# Patient Record
Sex: FEMALE | Race: OTHER | HISPANIC OR LATINO | URBAN - METROPOLITAN AREA
[De-identification: names, ages, dates, MRNs, and addresses within clinical notes are randomized per-mention and may not be internally consistent; named-entity substitution may affect disease eponyms.]

---

## 2017-04-26 ENCOUNTER — EMERGENCY (EMERGENCY)
Facility: HOSPITAL | Age: 57
LOS: 1 days | Discharge: LEFT BEFORE DISCHARGE | End: 2017-04-26
Attending: EMERGENCY MEDICINE
Payer: MEDICAID

## 2017-04-26 VITALS
HEART RATE: 77 BPM | WEIGHT: 199.96 LBS | TEMPERATURE: 98 F | HEIGHT: 62 IN | SYSTOLIC BLOOD PRESSURE: 146 MMHG | OXYGEN SATURATION: 100 % | DIASTOLIC BLOOD PRESSURE: 89 MMHG | RESPIRATION RATE: 20 BRPM
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
## 2017-04-26 DIAGNOSIS — T54.3X1A TOXIC EFFECT OF CORROSIVE ALKALIS AND ALKALI-LIKE SUBSTANCES, ACCIDENTAL (UNINTENTIONAL), INITIAL ENCOUNTER: ICD-10-CM
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
## 2017-04-26 PROCEDURE — 99285 EMERGENCY DEPT VISIT HI MDM: CPT | Mod: 25

## 2017-04-26 RX ORDER — PANTOPRAZOLE SODIUM 20 MG/1
8 TABLET, DELAYED RELEASE ORAL
Qty: 80 | Refills: 0 | Status: DISCONTINUED | OUTPATIENT
Start: 2017-04-26 | End: 2017-04-30

## 2017-04-26 RX ORDER — SODIUM CHLORIDE 9 MG/ML
1000 INJECTION INTRAMUSCULAR; INTRAVENOUS; SUBCUTANEOUS ONCE
Qty: 0 | Refills: 0 | Status: COMPLETED | OUTPATIENT
Start: 2017-04-26 | End: 2017-04-26

## 2017-04-26 RX ORDER — PANTOPRAZOLE SODIUM 20 MG/1
40 TABLET, DELAYED RELEASE ORAL ONCE
Qty: 0 | Refills: 0 | Status: COMPLETED | OUTPATIENT
Start: 2017-04-26 | End: 2017-04-26

## 2017-04-26 NOTE — ED ADULT TRIAGE NOTE - CHIEF COMPLAINT QUOTE
P/s drank Clorax by mistake, which was in a water bottle x 1 am. Now has pain in throat and difficulty swallowing.

## 2017-04-26 NOTE — ED PROVIDER NOTE - MEDICAL DECISION MAKING DETAILS
Pt c/o throat pain s/p consuming household Clorox; will Magic Mouthwash and d/c home with GI f/u. Benign exam. Pt c/o throat pain s/p consuming household Clorox; will give Magic Mouthwash and consult GI. Benign exam.  Update: Consulted GI and Poison control: pt will be admitted for scope in the morning Pt c/o throat pain s/p consuming Ammonia; consulted GI/poison control. Pt will be admitted for scope in the morning, NPO/protonix tonight, upon returning to bedside to tell patient the dispo plan, she was not in stretcher and another patient reported that she left.  Tried two different numbers in chart- both wrong numbers

## 2017-04-26 NOTE — ED PROVIDER NOTE - PROGRESS NOTE DETAILS
obvious burns/ irritation, drooling, vomiting, stridor, are indication for scope by GI now,  D/W Poison Ctrl Ctr: bar [place]. should be able to tolerate PO prior to any d/c home Consulted GI and Poison control: pt will be admitted for scope in the morning discussed with pt that she needs to see specialist, to be admitted for scope, to wait in stretcher until I speak w specialist for details.  When I returned, she was not there and another patient nearby said she left.  I called two phone numbers on her chart, and they were both incorrect numbers. Consulted GI ([facility]) and Poison control: pt will be admitted for scope in the morning

## 2017-04-26 NOTE — ED ADULT NURSE NOTE - OBJECTIVE STATEMENT
Patient was received hemodynamically stable , in no acute distress, able to speak in full sentences and breathe comfortably in room air. Awaiting for blood test to be collected.

## 2017-04-26 NOTE — ED PROVIDER NOTE - PHYSICAL EXAMINATION
HEENMT: no oral lesions, Mouth with normal mucosa. Throat has no vesicles, no oropharyngeal exudates and uvula is midline.

## 2017-04-26 NOTE — ED PROVIDER NOTE - OBJECTIVE STATEMENT
55 y/o female  with no significant PMHx presents to the ED c/o throat pain and difficulty swallowing s/p accidentally consuming household Clorox x today at 0100. Pt reports Clorox (purchased from [employer], family member notes Clorox contains ammonia) was in water bottle and had accidentally consumed small amount and immediately self-induced emesis. Pt denies fever, abd pain, cough, SOB, or any other complaints. NKDA.

## 2017-04-26 NOTE — ED PROVIDER NOTE - NS ED MD SCRIBE ATTENDING SCRIBE SECTIONS
DISPOSITION/HIV/PHYSICAL EXAM/VITAL SIGNS( Pullset)/PAST MEDICAL/SURGICAL/SOCIAL HISTORY/REVIEW OF SYSTEMS/HISTORY OF PRESENT ILLNESS

## 2017-04-30 PROCEDURE — 99282 EMERGENCY DEPT VISIT SF MDM: CPT

## 2022-02-10 ENCOUNTER — APPOINTMENT (OUTPATIENT)
Dept: ORTHOPEDIC SURGERY | Facility: CLINIC | Age: 62
End: 2022-02-10
Payer: MEDICARE

## 2022-02-10 VITALS — HEIGHT: 66 IN | WEIGHT: 220 LBS | BODY MASS INDEX: 35.36 KG/M2

## 2022-02-10 DIAGNOSIS — M77.8 OTHER ENTHESOPATHIES, NOT ELSEWHERE CLASSIFIED: ICD-10-CM

## 2022-02-10 DIAGNOSIS — M21.862 OTHER SPECIFIED ACQUIRED DEFORMITIES OF LEFT LOWER LEG: ICD-10-CM

## 2022-02-10 DIAGNOSIS — M79.672 PAIN IN LEFT FOOT: ICD-10-CM

## 2022-02-10 PROCEDURE — 73630 X-RAY EXAM OF FOOT: CPT | Mod: LT

## 2022-02-10 PROCEDURE — 99203 OFFICE O/P NEW LOW 30 MIN: CPT

## 2022-03-29 ENCOUNTER — APPOINTMENT (OUTPATIENT)
Dept: ORTHOPEDIC SURGERY | Facility: CLINIC | Age: 62
End: 2022-03-29

## 2022-03-29 ENCOUNTER — NON-APPOINTMENT (OUTPATIENT)
Age: 62
End: 2022-03-29

## 2022-04-02 ENCOUNTER — RX RENEWAL (OUTPATIENT)
Age: 62
End: 2022-04-02

## 2022-04-02 RX ORDER — DICLOFENAC SODIUM 1% 10 MG/G
1 GEL TOPICAL
Qty: 100 | Refills: 0 | Status: ACTIVE | COMMUNITY
Start: 2022-02-10 | End: 1900-01-01

## 2023-04-12 ENCOUNTER — EMERGENCY (EMERGENCY)
Facility: HOSPITAL | Age: 63
LOS: 1 days | Discharge: HOME | End: 2023-04-12
Attending: STUDENT IN AN ORGANIZED HEALTH CARE EDUCATION/TRAINING PROGRAM
Payer: COMMERCIAL

## 2023-04-12 VITALS
HEIGHT: 63 IN | TEMPERATURE: 98 F | SYSTOLIC BLOOD PRESSURE: 127 MMHG | RESPIRATION RATE: 17 BRPM | DIASTOLIC BLOOD PRESSURE: 85 MMHG | OXYGEN SATURATION: 98 % | HEART RATE: 69 BPM | WEIGHT: 149.91 LBS

## 2023-04-12 VITALS
SYSTOLIC BLOOD PRESSURE: 113 MMHG | DIASTOLIC BLOOD PRESSURE: 74 MMHG | RESPIRATION RATE: 17 BRPM | HEART RATE: 52 BPM | OXYGEN SATURATION: 99 %

## 2023-04-12 LAB
ALBUMIN SERPL ELPH-MCNC: 4.2 G/DL — SIGNIFICANT CHANGE UP (ref 3.3–5)
ALP SERPL-CCNC: 81 U/L — SIGNIFICANT CHANGE UP (ref 40–120)
ALT FLD-CCNC: 37 U/L — SIGNIFICANT CHANGE UP (ref 10–45)
ANION GAP SERPL CALC-SCNC: 13 MMOL/L — SIGNIFICANT CHANGE UP (ref 5–17)
AST SERPL-CCNC: 31 U/L — SIGNIFICANT CHANGE UP (ref 10–40)
BASOPHILS # BLD AUTO: 0.04 K/UL — SIGNIFICANT CHANGE UP (ref 0–0.2)
BASOPHILS NFR BLD AUTO: 0.5 % — SIGNIFICANT CHANGE UP (ref 0–2)
BILIRUB SERPL-MCNC: 0.4 MG/DL — SIGNIFICANT CHANGE UP (ref 0.2–1.2)
BUN SERPL-MCNC: 10 MG/DL — SIGNIFICANT CHANGE UP (ref 7–23)
CALCIUM SERPL-MCNC: 9 MG/DL — SIGNIFICANT CHANGE UP (ref 8.4–10.5)
CHLORIDE SERPL-SCNC: 104 MMOL/L — SIGNIFICANT CHANGE UP (ref 96–108)
CO2 SERPL-SCNC: 24 MMOL/L — SIGNIFICANT CHANGE UP (ref 22–31)
CREAT SERPL-MCNC: 0.66 MG/DL — SIGNIFICANT CHANGE UP (ref 0.5–1.3)
D DIMER BLD IA.RAPID-MCNC: 191 NG/ML DDU — SIGNIFICANT CHANGE UP
EGFR: 100 ML/MIN/1.73M2 — SIGNIFICANT CHANGE UP
EOSINOPHIL # BLD AUTO: 0.15 K/UL — SIGNIFICANT CHANGE UP (ref 0–0.5)
EOSINOPHIL NFR BLD AUTO: 2 % — SIGNIFICANT CHANGE UP (ref 0–6)
FLUAV AG NPH QL: SIGNIFICANT CHANGE UP
FLUBV AG NPH QL: SIGNIFICANT CHANGE UP
GLUCOSE SERPL-MCNC: 98 MG/DL — SIGNIFICANT CHANGE UP (ref 70–99)
HCT VFR BLD CALC: 38.8 % — SIGNIFICANT CHANGE UP (ref 34.5–45)
HGB BLD-MCNC: 12.7 G/DL — SIGNIFICANT CHANGE UP (ref 11.5–15.5)
IMM GRANULOCYTES NFR BLD AUTO: 0.4 % — SIGNIFICANT CHANGE UP (ref 0–0.9)
LYMPHOCYTES # BLD AUTO: 2.89 K/UL — SIGNIFICANT CHANGE UP (ref 1–3.3)
LYMPHOCYTES # BLD AUTO: 37.8 % — SIGNIFICANT CHANGE UP (ref 13–44)
MCHC RBC-ENTMCNC: 31.1 PG — SIGNIFICANT CHANGE UP (ref 27–34)
MCHC RBC-ENTMCNC: 32.7 GM/DL — SIGNIFICANT CHANGE UP (ref 32–36)
MCV RBC AUTO: 94.9 FL — SIGNIFICANT CHANGE UP (ref 80–100)
MONOCYTES # BLD AUTO: 0.92 K/UL — HIGH (ref 0–0.9)
MONOCYTES NFR BLD AUTO: 12 % — SIGNIFICANT CHANGE UP (ref 2–14)
NEUTROPHILS # BLD AUTO: 3.61 K/UL — SIGNIFICANT CHANGE UP (ref 1.8–7.4)
NEUTROPHILS NFR BLD AUTO: 47.3 % — SIGNIFICANT CHANGE UP (ref 43–77)
NRBC # BLD: 0 /100 WBCS — SIGNIFICANT CHANGE UP (ref 0–0)
PLATELET # BLD AUTO: 224 K/UL — SIGNIFICANT CHANGE UP (ref 150–400)
POTASSIUM SERPL-MCNC: 3.6 MMOL/L — SIGNIFICANT CHANGE UP (ref 3.5–5.3)
POTASSIUM SERPL-SCNC: 3.6 MMOL/L — SIGNIFICANT CHANGE UP (ref 3.5–5.3)
PROT SERPL-MCNC: 6.6 G/DL — SIGNIFICANT CHANGE UP (ref 6–8.3)
RBC # BLD: 4.09 M/UL — SIGNIFICANT CHANGE UP (ref 3.8–5.2)
RBC # FLD: 14 % — SIGNIFICANT CHANGE UP (ref 10.3–14.5)
RSV RNA NPH QL NAA+NON-PROBE: SIGNIFICANT CHANGE UP
SARS-COV-2 RNA SPEC QL NAA+PROBE: SIGNIFICANT CHANGE UP
SODIUM SERPL-SCNC: 141 MMOL/L — SIGNIFICANT CHANGE UP (ref 135–145)
TROPONIN T, HIGH SENSITIVITY RESULT: <6 NG/L — SIGNIFICANT CHANGE UP (ref 0–51)
TROPONIN T, HIGH SENSITIVITY RESULT: <6 NG/L — SIGNIFICANT CHANGE UP (ref 0–51)
WBC # BLD: 7.64 K/UL — SIGNIFICANT CHANGE UP (ref 3.8–10.5)
WBC # FLD AUTO: 7.64 K/UL — SIGNIFICANT CHANGE UP (ref 3.8–10.5)

## 2023-04-12 PROCEDURE — 71045 X-RAY EXAM CHEST 1 VIEW: CPT | Mod: 26

## 2023-04-12 PROCEDURE — 84484 ASSAY OF TROPONIN QUANT: CPT

## 2023-04-12 PROCEDURE — 87637 SARSCOV2&INF A&B&RSV AMP PRB: CPT

## 2023-04-12 PROCEDURE — 71045 X-RAY EXAM CHEST 1 VIEW: CPT

## 2023-04-12 PROCEDURE — 80053 COMPREHEN METABOLIC PANEL: CPT

## 2023-04-12 PROCEDURE — 99285 EMERGENCY DEPT VISIT HI MDM: CPT

## 2023-04-12 PROCEDURE — 99285 EMERGENCY DEPT VISIT HI MDM: CPT | Mod: 25

## 2023-04-12 PROCEDURE — 93005 ELECTROCARDIOGRAM TRACING: CPT

## 2023-04-12 PROCEDURE — 85379 FIBRIN DEGRADATION QUANT: CPT

## 2023-04-12 PROCEDURE — 85025 COMPLETE CBC W/AUTO DIFF WBC: CPT

## 2023-04-12 NOTE — ED PROVIDER NOTE - NSFOLLOWUPINSTRUCTIONS_ED_ALL_ED_FT
You were seen in the emergency department for chest pain.     An evaluation that was performed today did not show that you had a dangerous or life threatening cause of your pain.     PLEASE BE AWARE THAT AN EMERGENCY DEPARTMENT EVALUATION CANNOT FULLY RULE OUT ALL RISK OF POTENTIAL LIFE THREATENING CAUSES OF CHEST PAIN - INCLUDING A HEART ATTACK.     Please make an appointment to see your doctor in the next several days for a complete exam. If you have chest pain, dizziness, shortness of breath, numbness, profuse sweating, or pain that radiates to your arms or jaw - return to the ED promptly. Please ask your doctor for a referral for a stress test to evaluate the blood flow to your heart and possible risks of potential heart disease or heart attack    RETURN TO THE ED RIGHT AWAY IF:  - YOU HAVE FURTHER EPISODES OF CHEST PAIN, or if your pain changes, or radiates to your arm, back or jaw or if you have dizziness / sweating or feel that you may vomit - THIS IS AN EMERGENCY.     Do not wait to see if the pain will go away. Get medical help at once. Call your local emergency services ([phone]). Do not drive yourself to the hospital.     RETURN TO THE EMERGENCY DEPARTMENT IF:  - YOU HAVE TROUBLE BREATHING  - YOU FEEL THAT YOUR SYMPTOMS ARE WORSENING  - YOU HAVE FACIAL DROOP OR WEAKNESS ON ONE SIDE OF YOUR BODY  - YOU HAVE FEVERS OVER 100.5 THAT DO NOT GO DOWN WITH MOTRIN OR TYLENOL  - YOU HAVE CONTINUED VOMITING OR DIARRHEA AND YOU CANNOT TOLERATE DRINKING LIQUIDS    YOU MAY ALWAYS RETURN TO THE ED IF YOU FEEL SICK OR HAVE CONCERNS

## 2023-04-12 NOTE — ED PROVIDER NOTE - PHYSICAL EXAMINATION
GEN: Pt well appearing in NAD, alert.  PSYCH: Affect and mood appropriate.  EYES: Sclera white w/o injection, EOMI.  ENT: Neck supple. Airway patent.  RESP: CTA b/l, no wheezes, rales, or rhonchi.   CARDIAC: RRR, clear distinct S1, S2, no appreciable murmurs.  ABD: Soft, non-tender. No CVAT b/l.  MSK: DELGADO. FROM throughout.  NEURO: No focal motor or sensory deficits.  VASC: Strong distal pulses. No edema. No calf tenderness.  SKIN: No rashes or lesions.

## 2023-04-12 NOTE — ED PROVIDER NOTE - PATIENT PORTAL LINK FT
You can access the FollowMyHealth Patient Portal offered by [facility] by registering at the following website: [email or web address]. By joining [facility]’s FollowMyHealth portal, you will also be able to view your health information using other applications (apps) compatible with our system.

## 2023-04-12 NOTE — ED ADULT NURSE NOTE - OBJECTIVE STATEMENT
PT is a 62 year old A&OX4 female with PMH of HTN, HLD, DM, GERD, and breast cancer with right-sided lumpectomy on right arm precautions  and PSH of gastric bypass with umbilical hernia repair who presents to the ED from home with c/o left-sided chest pain that she describes as a "poke" that was episodic. PT endorsing episode yesterday and today. PT currently rates her chest pain a 3/10. PT denies SOB, N/V/D, dizziness, and fevers at home. PT states she does not currently take any at-home medications for her PMH. PT is resting comfortably in bed, breathing unlabored on room air, and speaking in complete sentences. Abdomen is soft, non-tender, and non-distended. Skin is warm and dry, no diaphoresis noted. No edema noted to B/L extremities. Strong strength in B/L extremities, sensation intact. IV access established 20G in left AC. PT placed on cardiac monitor. PT placed in hospital gown, non-slip socks applied. PT ambulatory with steady gait. Safety and comfort maintained. Family at the bedside.

## 2023-04-12 NOTE — ED PROVIDER NOTE - PROGRESS NOTE DETAILS
no further cp. likely dc home if rpt trop stable Devyn Manuel PA-C: trop <6 x2. Pt left without receiving dc paperwork. IV was pulled earlier by RN. Pt was encouraged to f/u with her pmd and cardiologist.

## 2023-04-12 NOTE — ED PROVIDER NOTE - OBJECTIVE STATEMENT
[id number] [place]  62y F pmhx Breast ca s/p lymph node resection never on radiation or chemo, HTN, HLD, DM, no longer on meds s/p bariatric surgery umbilical hernia repair on omeprazole and multivitamin, b/l TKA, presents to ED c/o chest pain x 2 days. Chest pain is intermittent, mild to moderate, described as "poking" like sensation, nonradiating.  Patient had episode of pain yesterday while driving and then today while bringing her father to the cardiologist and pushing him in wheelchair.  Patient told staff about her complaint and was referred to emergency room for evaluation.  Denies shortness of breath, abdominal pain, nausea, vomiting, urinary complaints.  Family history of cardiac disease father in 70s had bypass.  Former smoker.  Had normal nuclear stress test approximately 1 year ago Dr. Boris Polk.

## 2023-04-12 NOTE — ED PROVIDER NOTE - CLINICAL SUMMARY MEDICAL DECISION MAKING FREE TEXT BOX
Patient is a 62-year-old female who presents to the emergency department with chest pain that is intermittent and sharp last episode was this morning at the cardiologist office at approximately 10 AM.  Patient to have labs drawn trending and likely DC home with outpatient cardiology follow-up low suspicion for ACS given chest pain is not with exertion no associated diaphoresis and none radiation of the pain down the jaw nor the arm

## 2023-04-12 NOTE — ED PROVIDER NOTE - ATTENDING APP SHARED VISIT CONTRIBUTION OF CARE
[id number]  shawn   62 F w/ hx of breast ca, lymph node resection, htn, hld, dm Gastric bypass surgery presents to the emergency department with intermittent sharp needlelike pain on the left side of the chest wall last 1-2 seconds and self resolves  Patient states that she had 2 episodes in the past 24 hours post on the left side just by the breast.  Patient with no fevers no chills no associated shortness of breath or diaphoresis with the pain no radiation of the pain.  Patient states that she was at the cardiologist office with her father and mentioned it to the doctor who referred her to the hospital.  Patient reports that she is compliant with her home medications.  She has no unilateral leg swelling.  Patient denies any long car rides or plane rides.  Patient denies any abdominal pain.  Patient had EKG done on 4/12/2023 at 12:20 PM that reveals sinus bradycardia at rate of 56 axis is leftward AR interval 178 QRS 98 QTc 432 no ST segment elevation or depression however there is flattening in 3, aVF as well as V2.  Patient is a 62-year-old female who presents to the emergency department with chest pain that is intermittent and sharp last episode was this morning at the cardiologist office at approximately 10 AM.  Patient to have labs drawn trending and likely DC home with outpatient cardiology follow-up low suspicion for ACS given chest pain is not with exertion no associated diaphoresis and none radiation of the pain down the jaw nor the arm

## 2024-03-11 NOTE — ED ADULT NURSE NOTE - TEMPLATE
The patient's goals for the shift include pain control    The clinical goals for the shift include Pt will be free from injury through out shift. Pt dscharged to [facility]  for rehab     General

## 2025-04-11 ENCOUNTER — APPOINTMENT (OUTPATIENT)
Dept: ORTHOPEDIC SURGERY | Facility: CLINIC | Age: 65
End: 2025-04-11
Payer: MEDICARE

## 2025-04-11 ENCOUNTER — NON-APPOINTMENT (OUTPATIENT)
Age: 65
End: 2025-04-11

## 2025-04-11 VITALS — HEIGHT: 67 IN | WEIGHT: 166 LBS | BODY MASS INDEX: 26.06 KG/M2

## 2025-04-11 DIAGNOSIS — M65.4 RADIAL STYLOID TENOSYNOVITIS [DE QUERVAIN]: ICD-10-CM

## 2025-04-11 PROCEDURE — 20550 NJX 1 TENDON SHEATH/LIGAMENT: CPT | Mod: LT

## 2025-04-11 PROCEDURE — 99204 OFFICE O/P NEW MOD 45 MIN: CPT | Mod: 25

## 2025-06-03 ENCOUNTER — APPOINTMENT (OUTPATIENT)
Dept: ORTHOPEDIC SURGERY | Facility: CLINIC | Age: 65
End: 2025-06-03
Payer: MEDICARE

## 2025-06-03 DIAGNOSIS — M65.4 RADIAL STYLOID TENOSYNOVITIS [DE QUERVAIN]: ICD-10-CM

## 2025-06-03 PROCEDURE — 20550 NJX 1 TENDON SHEATH/LIGAMENT: CPT | Mod: LT

## 2025-06-03 PROCEDURE — 99214 OFFICE O/P EST MOD 30 MIN: CPT | Mod: 25

## 2025-07-07 ENCOUNTER — APPOINTMENT (OUTPATIENT)
Dept: ORTHOPEDIC SURGERY | Facility: CLINIC | Age: 65
End: 2025-07-07

## 2025-07-07 PROCEDURE — 25000 INCISION OF TENDON SHEATH: CPT | Mod: LT

## 2025-07-18 ENCOUNTER — APPOINTMENT (OUTPATIENT)
Dept: ORTHOPEDIC SURGERY | Facility: CLINIC | Age: 65
End: 2025-07-18
Payer: MEDICARE

## 2025-07-18 PROCEDURE — 99024 POSTOP FOLLOW-UP VISIT: CPT

## 2025-08-15 ENCOUNTER — APPOINTMENT (OUTPATIENT)
Dept: ORTHOPEDIC SURGERY | Facility: CLINIC | Age: 65
End: 2025-08-15
Payer: MEDICARE

## 2025-08-15 DIAGNOSIS — G56.03 CARPAL TUNNEL SYNDROM,BILATERAL UPPER LIMBS: ICD-10-CM

## 2025-08-15 DIAGNOSIS — M65.4 RADIAL STYLOID TENOSYNOVITIS [DE QUERVAIN]: ICD-10-CM

## 2025-08-15 PROCEDURE — 99214 OFFICE O/P EST MOD 30 MIN: CPT | Mod: 25
